# Patient Record
Sex: MALE | Race: WHITE
[De-identification: names, ages, dates, MRNs, and addresses within clinical notes are randomized per-mention and may not be internally consistent; named-entity substitution may affect disease eponyms.]

---

## 2023-04-13 LAB
ALBUMIN SERPL BCP-MCNC: 4.1 G/DL (ref 3.4–5)
ALBUMIN/GLOB SERPL: 1.4 {RATIO} (ref 1.1–1.5)
ALP SERPL-CCNC: 62 IU/L (ref 46–116)
ALT SERPL W P-5'-P-CCNC: 30 U/L (ref 30–65)
ANION GAP SERPL CALCULATED.3IONS-SCNC: 6 MMOL/L (ref 8–16)
AST SERPL W P-5'-P-CCNC: 19 U/L (ref 10–37)
BASOPHILS # BLD AUTO: 0 X10'3 (ref 0–0.2)
BASOPHILS NFR BLD AUTO: 1.2 % (ref 0–1)
BILIRUB SERPL-MCNC: 0.5 MG/DL (ref 0–1)
BUN SERPL-MCNC: 12 MG/DL (ref 7–18)
BUN/CREAT SERPL: 13 (ref 10–20)
CALCIUM SERPL-MCNC: 8.8 MG/DL (ref 8.5–10.1)
CHLORIDE SERPL-SCNC: 103 MMOL/L (ref 99–107)
CLARITY UR: CLEAR
CO2 SERPL-SCNC: 29.6 MMOL/L (ref 24–32)
COLOR UR: YELLOW
CREAT SERPL-MCNC: 0.92 MG/DL (ref 0.6–1.1)
EOSINOPHIL # BLD AUTO: 0.1 X10'3 (ref 0–0.9)
EOSINOPHIL NFR BLD AUTO: 4 % (ref 0–6)
EOSINOPHIL NFR BLD MANUAL: 3 % (ref 0–6)
ERYTHROCYTE [DISTWIDTH] IN BLOOD BY AUTOMATED COUNT: 13.8 % (ref 11.5–14.5)
GFR SERPL CREATININE-BSD FRML MDRD: 88 ML/MIN
GLUCOSE SERPL-MCNC: 94 MG/DL (ref 70–104)
GLUCOSE UR STRIP-MCNC: NEGATIVE MG/DL
HCT VFR BLD AUTO: 42.5 % (ref 42–52)
HGB BLD-MCNC: 14.7 G/DL (ref 14–17.9)
HGB UR QL STRIP: NEGATIVE
KETONES UR STRIP-MCNC: NEGATIVE MG/DL
LEUKOCYTE ESTERASE UR QL STRIP: NEGATIVE
LYMPHOCYTES # BLD AUTO: 1 X10'3 (ref 1.1–4.8)
LYMPHOCYTES NFR BLD AUTO: 34.9 % (ref 21–51)
LYMPHOCYTES NFR BLD MANUAL: 38 % (ref 21–51)
MCH RBC QN AUTO: 31.6 PG (ref 27–31)
MCHC RBC AUTO-ENTMCNC: 34.5 G/DL (ref 33–36.5)
MCV RBC AUTO: 91.5 FL (ref 78–98)
MONOCYTES # BLD AUTO: 0.4 X10'3 (ref 0–0.9)
MONOCYTES NFR BLD AUTO: 12.3 % (ref 2–12)
MONOCYTES NFR BLD MANUAL: 8 % (ref 2–12)
NEUTROPHILS # BLD AUTO: 1.4 X10'3 (ref 1.8–7.7)
NEUTROPHILS NFR BLD AUTO: 47.6 % (ref 42–75)
NEUTS SEG NFR BLD MANUAL: 51 % (ref 42–75)
NITRITE UR QL STRIP: NEGATIVE
PH UR STRIP: 7 [PH] (ref 4.8–8)
PLATELET # BLD AUTO: 247 X10'3 (ref 140–440)
PLATELET BLD QL SMEAR: NORMAL
PMV BLD AUTO: 8 FL (ref 7.4–10.4)
POTASSIUM SERPL-SCNC: 4.4 MMOL/L (ref 3.4–5.1)
PROT SERPL-MCNC: 7.1 G/DL (ref 6.4–8.2)
PROT UR QL STRIP: NEGATIVE MG/DL
RBC # BLD AUTO: 4.64 X10'6 (ref 4.7–6.1)
RBC MORPH BLD: (no result)
SODIUM SERPL-SCNC: 139 MMOL/L (ref 135–145)
SP GR UR STRIP: 1.01 (ref 1–1.03)
STOMATOCYTES BLD QL SMEAR: (no result)
TOTAL CELLS COUNTED FLD: 100
URN COLLECT METHOD CLASS: (no result)
UROBILINOGEN UR STRIP-MCNC: 0.2 E.U/DL (ref 0.2–1)

## 2023-04-14 ENCOUNTER — HOSPITAL ENCOUNTER (OUTPATIENT)
Dept: HOSPITAL 94 - PAS | Age: 48
Discharge: HOME | End: 2023-04-14
Attending: STUDENT IN AN ORGANIZED HEALTH CARE EDUCATION/TRAINING PROGRAM
Payer: COMMERCIAL

## 2023-04-14 VITALS — DIASTOLIC BLOOD PRESSURE: 75 MMHG | SYSTOLIC BLOOD PRESSURE: 114 MMHG

## 2023-04-14 VITALS — DIASTOLIC BLOOD PRESSURE: 75 MMHG | SYSTOLIC BLOOD PRESSURE: 115 MMHG

## 2023-04-14 VITALS — SYSTOLIC BLOOD PRESSURE: 122 MMHG | DIASTOLIC BLOOD PRESSURE: 69 MMHG

## 2023-04-14 VITALS — DIASTOLIC BLOOD PRESSURE: 92 MMHG | SYSTOLIC BLOOD PRESSURE: 133 MMHG

## 2023-04-14 VITALS — BODY MASS INDEX: 25.21 KG/M2 | WEIGHT: 196.43 LBS | HEIGHT: 74 IN

## 2023-04-14 VITALS — SYSTOLIC BLOOD PRESSURE: 118 MMHG | DIASTOLIC BLOOD PRESSURE: 80 MMHG

## 2023-04-14 VITALS — DIASTOLIC BLOOD PRESSURE: 72 MMHG | SYSTOLIC BLOOD PRESSURE: 107 MMHG

## 2023-04-14 VITALS — SYSTOLIC BLOOD PRESSURE: 116 MMHG | DIASTOLIC BLOOD PRESSURE: 60 MMHG
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
## 2023-04-14 DIAGNOSIS — Z98.890: ICD-10-CM
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
## 2023-04-14 DIAGNOSIS — S82.62XA: Primary | ICD-10-CM

## 2023-04-14 DIAGNOSIS — Z88.8: ICD-10-CM

## 2023-04-14 DIAGNOSIS — G89.18: ICD-10-CM
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
## 2023-04-14 DIAGNOSIS — Z79.82: ICD-10-CM

## 2023-04-14 DIAGNOSIS — Z72.89: ICD-10-CM

## 2023-04-14 DIAGNOSIS — Z79.899: ICD-10-CM

## 2023-04-14 PROCEDURE — 80053 COMPREHEN METABOLIC PANEL: CPT

## 2023-04-14 PROCEDURE — 27792 TREATMENT OF ANKLE FRACTURE: CPT

## 2023-04-14 PROCEDURE — 36415 COLL VENOUS BLD VENIPUNCTURE: CPT

## 2023-04-14 PROCEDURE — 64445 NJX AA&/STRD SCIATIC NRV IMG: CPT

## 2023-04-14 PROCEDURE — 82948 REAGENT STRIP/BLOOD GLUCOSE: CPT

## 2023-04-14 PROCEDURE — 85007 BL SMEAR W/DIFF WBC COUNT: CPT

## 2023-04-14 PROCEDURE — 81003 URINALYSIS AUTO W/O SCOPE: CPT

## 2023-04-14 PROCEDURE — 85025 COMPLETE CBC W/AUTO DIFF WBC: CPT

## 2023-04-14 PROCEDURE — 93005 ELECTROCARDIOGRAM TRACING: CPT

## 2023-04-14 RX ADMIN — MEPERIDINE HYDROCHLORIDE PRN MG: 25 INJECTION, SOLUTION INTRAMUSCULAR; INTRAVENOUS; SUBCUTANEOUS at 15:16

## 2023-04-14 RX ADMIN — MEPERIDINE HYDROCHLORIDE PRN MG: 25 INJECTION, SOLUTION INTRAMUSCULAR; INTRAVENOUS; SUBCUTANEOUS at 15:10

## 2023-04-14 NOTE — NUR
Received from OR via [facility], accompanied by Anesthesiologist and report given by ALEXA 
Anesthesiologist. PATIENT WAKING UP, DENIES PAIN, V/S WNL, 18G TO RIGHT FOREARM, LEFT ANKLE 
DRESSING C/D/I. ELEVATE AND ICE PACK LEFT LOWER EXTREMITY.

-------------------------------------------------------------------------------

Addendum: 04/14/23 at 1504 by Aldo Pastrana RN

-------------------------------------------------------------------------------

Amended: Links added.

## 2023-04-14 NOTE — NUR
ALL DISCHARGE CRITERIA HAS BEEN MET. VSS, PAIN AT A TOLERABLE LEVEL, ABLE TO SAFELY AMBULATE 
AND TRANSFER SELF. IV TAKEN OUT WITHOUT ANY COMPLICATIONS. ALL DISCHARGE INSTRUCTIONS 
COVERED WITH PATIENT AND ALL QUESTIONS ANSWERED. PATIENT TAKEN OUT VIA WHEELCHAIR WITH ALL 
BELONGINGS TO PERSONAL VEHICLE WHERE FAMILY DROVE PATIENT HOME.

-------------------------------------------------------------------------------

Addendum: 04/14/23 at 1552 by Aldo Pastrana RN

-------------------------------------------------------------------------------

Amended: Links added.